# Patient Record
Sex: FEMALE | Race: BLACK OR AFRICAN AMERICAN
[De-identification: names, ages, dates, MRNs, and addresses within clinical notes are randomized per-mention and may not be internally consistent; named-entity substitution may affect disease eponyms.]

---

## 2017-02-27 ENCOUNTER — HOSPITAL ENCOUNTER (EMERGENCY)
Dept: HOSPITAL 45 - C.EDB | Age: 22
LOS: 1 days | Discharge: HOME | End: 2017-02-28
Payer: OTHER GOVERNMENT

## 2017-02-27 VITALS
BODY MASS INDEX: 38.44 KG/M2 | WEIGHT: 244.93 LBS | HEIGHT: 67.01 IN | BODY MASS INDEX: 38.44 KG/M2 | WEIGHT: 244.93 LBS | HEIGHT: 67.01 IN

## 2017-02-27 VITALS — TEMPERATURE: 99.5 F

## 2017-02-27 DIAGNOSIS — Z83.3: ICD-10-CM

## 2017-02-27 DIAGNOSIS — Z20.6: Primary | ICD-10-CM

## 2017-02-27 DIAGNOSIS — Z82.49: ICD-10-CM

## 2017-02-27 NOTE — EMERGENCY ROOM VISIT NOTE
History


Report prepared by Jayla:  Travis Gallagher


Under the Supervision of:  Dr. Sarah Gurrola D.O.


First contact with patient:  23:44


Chief Complaint:  OTHER COMPLAINT


Stated Complaint:  PERSONAL BUT URGENT,REFUSED TO TELL ME





History of Present Illness


The patient is a 21 year old female who presents to the Emergency Room 

requesting a rapid HIV test. The patient recently found out that her boyfriend 

of many years was HIV positive. He had a positive test last week. The patient 

herself is completely asymptomatic. She denies any leg cramping, swelling, or 

any rashes. She receives regular gynecologic care and is currently on her 

period. The patient denies any health problems. The patient is a [employer] 

student and her significant other is a college student at another school. The 

patient was last sexually active weeks ago, however they have had unprotected 

sex.





   Source of History:  patient


   Onset:  tonight


   Position:  other (general)


   Quality:  other (HIV test request)


   Timing:  other (rapid)


   Associated Symptoms:  No rash





Review of Systems


The patient denies any symptoms, and is only here for HIV testing.





Past Medical & Surgical


Medical Problems:


(1) Folliculitis


(2) Gastroenteritis


(3) Pilonidal cyst with abscess








Family History





Cancer


Diabetes mellitus


Hypertension





Social History


Smoking Status:  Never Smoker


Alcohol Use:  none


Drug Use:  none


Marital Status:  in relationship


Housing Status:  lives with roommate


Occupation Status:  [employer] student





Current/Historical Medications


Scheduled


Birth Control Pills (Birth Control Pills), 1 TAB PO DAILY





Allergies


Coded Allergies:  


     No Known Allergies (Unverified , 2/27/17)





Physical Exam


Vital Signs











  Date Time  Temp Pulse Resp B/P Pulse Ox O2 Delivery O2 Flow Rate FiO2


 


2/28/17 03:12  80 18 158/100 100   


 


2/27/17 23:29 37.5 93 18 156/106 100 Room Air  











Physical Exam


HEENT: Head - normocephalic and atraumatic   Pupils are equal, round, and 

reactive to light.  Extraocular eye muscles are intact, and sclera are 

anicteric.   Nose -  moist nasal mucosa without discharge. Mouth - moist buccal 

mucosa.  Oropharynx is nonerythematous and there is no tonsillar exudate or 

edema noted.


Neck: Supple; no JVD, nuchal rigidity, cervical lymphadenopathy.


Heart: Regular rate and rhythm.  There is a normal S1 and S2 with no murmurs, 

clicks, or gallops appreciated.


Lungs: Clear to auscultation bilaterally with no wheezes, rales, or rhonchi.


Abdomen: Soft, completely nontender, nondistended, with good bowel sounds.  

There are no palpable pulsatile masses or hepatosplenomegaly.  There is no 

guarding, rigidity, or rebound noted.


Extremities: No evidence of cyanosis, clubbing, or edema.  There are easily 

palpable peripheral pulses.


Skin: warm and dry with good turgor and no rashes.





Medical Decision & Procedures


Laboratory Results











Test


  2/28/17


00:08


 


HIV (1&2) Ab and P24 Ag, 4th


Gener NEG (NEG) 


 





Laboratory results per my review.





ED Course


2348: Past medical records reviewed. The patient was evaluated in room C12b. A 

complete history and physical exam was performed.  Pretest counseling was 

performed.  Labs were drawn.





0300: The patient's HIV test came back negative. I discussed the finding with 

her.  Posttest counseling was provided.  She verbalized understanding and 

agreement of the treatment plan. The patient is ready for discharge.





Medical Decision


The patient is a 21 year old female who presents to the ED for a rapid HIV 

test. 





HIV test was negative.





Impression





 Primary Impression:  


 Encounter for HIV (human immunodeficiency virus) test





Scribe Attestation


The scribe's documentation has been prepared under my direction and personally 

reviewed by me in its entirety. I confirm that the note above accurately 

reflects all work, treatment, procedures, and medical decision making performed 

by me.





Departure Information


Dispostion


Home / Self-Care





Referrals


No Doctor, Assigned (PCP)





Forms


HOME CARE DOCUMENTATION FORM,                                                 

               IMPORTANT VISIT INFORMATION, WORK / SCHOOL INSTRUCTIONS





Patient Instructions


My [facility]





Additional Instructions





Please follow up with the department of health for re-testing in one month.





You must have protected sex.

## 2017-02-28 VITALS — HEART RATE: 80 BPM | SYSTOLIC BLOOD PRESSURE: 158 MMHG | DIASTOLIC BLOOD PRESSURE: 100 MMHG | OXYGEN SATURATION: 100 %

## 2017-04-19 ENCOUNTER — HOSPITAL ENCOUNTER (OUTPATIENT)
Dept: HOSPITAL 45 - C.LABSPEC | Age: 22
Discharge: HOME | End: 2017-04-19
Attending: PHYSICIAN ASSISTANT
Payer: OTHER GOVERNMENT

## 2017-04-19 DIAGNOSIS — N89.8: Primary | ICD-10-CM

## 2018-01-12 ENCOUNTER — HOSPITAL ENCOUNTER (OUTPATIENT)
Dept: HOSPITAL 45 - C.LABSPEC | Age: 23
Discharge: HOME | End: 2018-01-12
Attending: PHYSICIAN ASSISTANT
Payer: OTHER GOVERNMENT

## 2018-01-12 ENCOUNTER — HOSPITAL ENCOUNTER (OUTPATIENT)
Dept: HOSPITAL 45 - C.PAPS | Age: 23
Discharge: HOME | End: 2018-01-12
Attending: PHYSICIAN ASSISTANT
Payer: OTHER GOVERNMENT

## 2018-01-12 DIAGNOSIS — Z01.419: Primary | ICD-10-CM
